# Patient Record
Sex: FEMALE | Race: WHITE | NOT HISPANIC OR LATINO | Employment: FULL TIME | URBAN - METROPOLITAN AREA
[De-identification: names, ages, dates, MRNs, and addresses within clinical notes are randomized per-mention and may not be internally consistent; named-entity substitution may affect disease eponyms.]

---

## 2023-10-24 PROBLEM — G89.29 CHRONIC BACK PAIN: Status: ACTIVE | Noted: 2023-10-24

## 2023-10-24 PROBLEM — G89.29 CHRONIC NECK PAIN: Status: ACTIVE | Noted: 2023-10-24

## 2023-10-24 PROBLEM — I83.90 VARICOSE VEINS OF LOWER EXTREMITY: Status: ACTIVE | Noted: 2023-10-24

## 2023-10-24 PROBLEM — M54.9 CHRONIC BACK PAIN: Status: ACTIVE | Noted: 2023-10-24

## 2023-10-24 PROBLEM — M19.90 ARTHRITIS: Status: ACTIVE | Noted: 2023-10-24

## 2023-10-24 PROBLEM — L98.8 FACIAL RHYTIDS: Status: ACTIVE | Noted: 2023-10-24

## 2023-10-24 PROBLEM — K64.9 HEMORRHOIDS: Status: ACTIVE | Noted: 2023-10-24

## 2023-10-24 PROBLEM — M54.2 CHRONIC NECK PAIN: Status: ACTIVE | Noted: 2023-10-24

## 2023-10-24 PROBLEM — R92.8 ABNORMAL MAMMOGRAM: Status: ACTIVE | Noted: 2023-10-24

## 2023-10-24 PROBLEM — E55.9 VITAMIN D DEFICIENCY: Status: ACTIVE | Noted: 2023-10-24

## 2023-10-24 RX ORDER — TRETINOIN 0.5 MG/G
CREAM TOPICAL NIGHTLY
COMMUNITY
Start: 2020-12-11 | End: 2023-10-25 | Stop reason: SDUPTHER

## 2023-10-24 RX ORDER — ERGOCALCIFEROL 1.25 MG/1
1 CAPSULE ORAL WEEKLY
COMMUNITY
Start: 2022-11-08 | End: 2023-11-07 | Stop reason: DRUGHIGH

## 2023-10-24 RX ORDER — MULTIVITAMIN WITH IRON
TABLET ORAL
COMMUNITY

## 2023-10-25 ENCOUNTER — OFFICE VISIT (OUTPATIENT)
Dept: OPHTHALMOLOGY | Facility: CLINIC | Age: 58
End: 2023-10-25
Payer: COMMERCIAL

## 2023-10-25 ENCOUNTER — OFFICE VISIT (OUTPATIENT)
Dept: PRIMARY CARE | Facility: CLINIC | Age: 58
End: 2023-10-25
Payer: COMMERCIAL

## 2023-10-25 VITALS
SYSTOLIC BLOOD PRESSURE: 120 MMHG | BODY MASS INDEX: 21.83 KG/M2 | OXYGEN SATURATION: 94 % | TEMPERATURE: 96.8 F | HEART RATE: 74 BPM | DIASTOLIC BLOOD PRESSURE: 78 MMHG | HEIGHT: 65 IN | WEIGHT: 131 LBS

## 2023-10-25 DIAGNOSIS — Z12.31 VISIT FOR SCREENING MAMMOGRAM: ICD-10-CM

## 2023-10-25 DIAGNOSIS — H52.4 HYPEROPIA WITH PRESBYOPIA OF BOTH EYES: Primary | ICD-10-CM

## 2023-10-25 DIAGNOSIS — Z00.00 HEALTHCARE MAINTENANCE: ICD-10-CM

## 2023-10-25 DIAGNOSIS — M20.11 VALGUS DEFORMITY OF BOTH GREAT TOES: ICD-10-CM

## 2023-10-25 DIAGNOSIS — E55.9 VITAMIN D DEFICIENCY: ICD-10-CM

## 2023-10-25 DIAGNOSIS — R92.8 ABNORMAL MAMMOGRAM: ICD-10-CM

## 2023-10-25 DIAGNOSIS — M20.12 VALGUS DEFORMITY OF BOTH GREAT TOES: ICD-10-CM

## 2023-10-25 DIAGNOSIS — L70.9 ACNE, UNSPECIFIED ACNE TYPE: ICD-10-CM

## 2023-10-25 DIAGNOSIS — Z00.00 ANNUAL PHYSICAL EXAM: Primary | ICD-10-CM

## 2023-10-25 DIAGNOSIS — H52.03 HYPEROPIA WITH PRESBYOPIA OF BOTH EYES: Primary | ICD-10-CM

## 2023-10-25 DIAGNOSIS — M54.2 NECK PAIN: ICD-10-CM

## 2023-10-25 DIAGNOSIS — Z80.3 FAMILY HISTORY OF BREAST CANCER: ICD-10-CM

## 2023-10-25 PROCEDURE — 93000 ELECTROCARDIOGRAM COMPLETE: CPT | Performed by: INTERNAL MEDICINE

## 2023-10-25 PROCEDURE — 92015 DETERMINE REFRACTIVE STATE: CPT | Performed by: STUDENT IN AN ORGANIZED HEALTH CARE EDUCATION/TRAINING PROGRAM

## 2023-10-25 PROCEDURE — 99396 PREV VISIT EST AGE 40-64: CPT | Performed by: INTERNAL MEDICINE

## 2023-10-25 PROCEDURE — 1036F TOBACCO NON-USER: CPT | Performed by: INTERNAL MEDICINE

## 2023-10-25 PROCEDURE — 92004 COMPRE OPH EXAM NEW PT 1/>: CPT | Performed by: STUDENT IN AN ORGANIZED HEALTH CARE EDUCATION/TRAINING PROGRAM

## 2023-10-25 RX ORDER — TRETINOIN 0.5 MG/G
CREAM TOPICAL NIGHTLY
Qty: 20 G | Refills: 1 | Status: SHIPPED | OUTPATIENT
Start: 2023-10-25

## 2023-10-25 ASSESSMENT — REFRACTION_MANIFEST
OD_CYLINDER: -2.00
OD_AXIS: 085
OS_AXIS: 087
OD_SPHERE: +2.00
OS_SPHERE: +1.75
OS_CYLINDER: -1.25
OD_ADD: +2.00
OS_ADD: +2.00

## 2023-10-25 ASSESSMENT — ENCOUNTER SYMPTOMS
GASTROINTESTINAL NEGATIVE: 0
CARDIOVASCULAR NEGATIVE: 0
NEUROLOGICAL NEGATIVE: 0
ENDOCRINE NEGATIVE: 0
ALLERGIC/IMMUNOLOGIC NEGATIVE: 0
PSYCHIATRIC NEGATIVE: 0
RESPIRATORY NEGATIVE: 0
HEMATOLOGIC/LYMPHATIC NEGATIVE: 0
EYES NEGATIVE: 0
CONSTITUTIONAL NEGATIVE: 0
MUSCULOSKELETAL NEGATIVE: 0

## 2023-10-25 ASSESSMENT — CONF VISUAL FIELD
OD_SUPERIOR_TEMPORAL_RESTRICTION: 0
OD_SUPERIOR_NASAL_RESTRICTION: 0
OS_NORMAL: 1
OS_INFERIOR_TEMPORAL_RESTRICTION: 0
OS_SUPERIOR_NASAL_RESTRICTION: 0
OD_INFERIOR_TEMPORAL_RESTRICTION: 0
OS_SUPERIOR_TEMPORAL_RESTRICTION: 0
METHOD: COUNTING FINGERS
OD_NORMAL: 1
OS_INFERIOR_NASAL_RESTRICTION: 0
OD_INFERIOR_NASAL_RESTRICTION: 0

## 2023-10-25 ASSESSMENT — VISUAL ACUITY
OD_SC+: +2
OD_SC: 20/40
METHOD: SNELLEN - LINEAR
OS_SC: 20/20
OS_SC+: -2

## 2023-10-25 ASSESSMENT — SLIT LAMP EXAM - LIDS
COMMENTS: NORMAL
COMMENTS: NORMAL

## 2023-10-25 ASSESSMENT — PATIENT HEALTH QUESTIONNAIRE - PHQ9
2. FEELING DOWN, DEPRESSED OR HOPELESS: NOT AT ALL
1. LITTLE INTEREST OR PLEASURE IN DOING THINGS: NOT AT ALL
SUM OF ALL RESPONSES TO PHQ9 QUESTIONS 1 AND 2: 0

## 2023-10-25 ASSESSMENT — TONOMETRY
OD_IOP_MMHG: 19
IOP_METHOD: GOLDMANN APPLANATION
OS_IOP_MMHG: 20

## 2023-10-25 ASSESSMENT — CUP TO DISC RATIO
OD_RATIO: .30
OS_RATIO: .40

## 2023-10-25 ASSESSMENT — EXTERNAL EXAM - RIGHT EYE: OD_EXAM: NORMAL

## 2023-10-25 ASSESSMENT — EXTERNAL EXAM - LEFT EYE: OS_EXAM: NORMAL

## 2023-10-25 NOTE — PROGRESS NOTES
Assessment/Plan   Problem List Items Addressed This Visit          Eye/Vision problems    Hyperopia with presbyopia of both eyes - Primary     New spec rx released today per patient request. Ocular health wnl for age OU. Monitor 1 year or sooner prn. Refraction billed today. Discussed first time PAL use vs. DVO/NVO specs.           RTC 1 year for annual with JO ANN and BRISEIDA

## 2023-10-25 NOTE — ASSESSMENT & PLAN NOTE
New spec rx released today per patient request. Ocular health wnl for age OU. Monitor 1 year or sooner prn. Refraction billed today. Discussed first time PAL use vs. DVO/NVO specs.

## 2023-10-25 NOTE — PROGRESS NOTES
"Subjective   Patient ID: Baylee Mccormick is a 58 y.o. female who presents for Annual Exam.    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  Last colonoscopy 9/4/2019 showed hyperplastic polyp next in 10 years ,last mammogram November 19/2022, kdue now,her mother had breast cancer onset age 50 she is asking if she should do genetic testing patient has 2 daughter.  I advised her to do so.  She had a stressful year her  was ill with non-Hodgkin lymphoma her daughter has anxiety, patient able to manage at work and deny any insomnia, she has occasional fluttering in the chest.  She had her flu vaccine at work ,other vaccination up-to-date.  She has neck pain for which has been doing massotherapy at [facility] and she needs a referral.  She has bunion of both feet and asking for referral to see someone.           Review of Systems   Constitutional: Negative.    HENT: Negative.     Eyes: Negative.    Respiratory: Negative.     Cardiovascular:  Positive for palpitations.   Gastrointestinal: Negative.    Genitourinary: Negative.    Musculoskeletal:         As HPI.   Neurological: Negative.    Hematological: Negative.    Psychiatric/Behavioral: Negative.          As HPI.       Objective   /78 (BP Location: Left arm, Patient Position: Sitting)   Pulse 74   Temp 36 °C (96.8 °F) (Temporal)   Ht 1.651 m (5' 5\")   Wt 59.4 kg (131 lb)   SpO2 94%   BMI 21.80 kg/m²     Physical Exam  Vitals reviewed.   Constitutional:       General: She is not in acute distress.     Appearance: Normal appearance. She is normal weight.   HENT:      Head: Normocephalic and atraumatic.      Right Ear: Tympanic membrane and ear canal normal.      Left Ear: Tympanic membrane and ear canal normal.      Nose: Nose normal.      Mouth/Throat:      Pharynx: No oropharyngeal exudate or posterior oropharyngeal " erythema.   Eyes:      General: No scleral icterus.     Extraocular Movements: Extraocular movements intact.      Pupils: Pupils are equal, round, and reactive to light.   Neck:      Vascular: No carotid bruit.   Cardiovascular:      Rate and Rhythm: Normal rate and regular rhythm.      Pulses: Normal pulses.      Heart sounds: Normal heart sounds. No murmur heard.  Pulmonary:      Effort: Pulmonary effort is normal.      Breath sounds: Normal breath sounds.   Chest:   Breasts:     Right: Normal. No mass, skin change or tenderness.      Left: Normal. No mass, skin change or tenderness.   Abdominal:      General: Abdomen is flat. Bowel sounds are normal.      Palpations: Abdomen is soft.   Musculoskeletal:         General: Normal range of motion.      Cervical back: Normal range of motion and neck supple.      Right lower leg: No edema.      Left lower leg: No edema.      Comments: Prominent hallux valgus bilaterally.   Lymphadenopathy:      Cervical: No cervical adenopathy.      Upper Body:      Right upper body: No supraclavicular or axillary adenopathy.      Left upper body: No supraclavicular or axillary adenopathy.   Skin:     Comments: Some itchy marks.  Small abrasion right foot from shaving.   Neurological:      General: No focal deficit present.      Mental Status: She is alert and oriented to person, place, and time.   Psychiatric:         Mood and Affect: Mood normal.         Assessment/Plan   Problem List Items Addressed This Visit             ICD-10-CM    Abnormal mammogram R92.8    Vitamin D deficiency E55.9    Relevant Orders    Vitamin D 25-Hydroxy,Total (for eval of Vitamin D levels)    Annual physical exam - Primary Z00.00    Relevant Orders    CBC and Auto Differential    Comprehensive Metabolic Panel    Hemoglobin A1C    Lipid Panel    TSH with reflex to Free T4 if abnormal     Other Visit Diagnoses         Codes    Visit for screening mammogram     Z12.31    Relevant Orders    BI mammo bilateral  screening tomosynthesis    Neck pain     M54.2    Relevant Orders    Referral to [facility]    Acne, unspecified acne type     L70.9    Relevant Medications    tretinoin (Retin-A) 0.05 % cream    Valgus deformity of both great toes     M20.11, M20.12    Relevant Orders    Referral to Orthopaedic Surgery    Family history of breast cancer     Z80.3    Relevant Orders    Referral to Genetics    Healthcare maintenance     Z00.00    Relevant Orders    ECG 12 lead (Clinic Performed) (Completed)

## 2023-10-26 ASSESSMENT — ENCOUNTER SYMPTOMS
NEUROLOGICAL NEGATIVE: 1
CONSTITUTIONAL NEGATIVE: 1
PSYCHIATRIC NEGATIVE: 1
EYES NEGATIVE: 1
HEMATOLOGIC/LYMPHATIC NEGATIVE: 1
PALPITATIONS: 1
RESPIRATORY NEGATIVE: 1
GASTROINTESTINAL NEGATIVE: 1

## 2023-11-02 ENCOUNTER — LAB (OUTPATIENT)
Dept: LAB | Facility: LAB | Age: 58
End: 2023-11-02
Payer: COMMERCIAL

## 2023-11-02 DIAGNOSIS — E55.9 VITAMIN D DEFICIENCY: ICD-10-CM

## 2023-11-02 DIAGNOSIS — Z00.00 ANNUAL PHYSICAL EXAM: ICD-10-CM

## 2023-11-02 LAB
25(OH)D3 SERPL-MCNC: 16 NG/ML (ref 30–100)
ALBUMIN SERPL BCP-MCNC: 4.1 G/DL (ref 3.4–5)
ALP SERPL-CCNC: 60 U/L (ref 33–110)
ALT SERPL W P-5'-P-CCNC: 20 U/L (ref 7–45)
ANION GAP SERPL CALC-SCNC: 10 MMOL/L (ref 10–20)
AST SERPL W P-5'-P-CCNC: 21 U/L (ref 9–39)
BASOPHILS # BLD AUTO: 0.04 X10*3/UL (ref 0–0.1)
BASOPHILS NFR BLD AUTO: 0.8 %
BILIRUB SERPL-MCNC: 0.7 MG/DL (ref 0–1.2)
BUN SERPL-MCNC: 19 MG/DL (ref 6–23)
CALCIUM SERPL-MCNC: 9 MG/DL (ref 8.6–10.3)
CHLORIDE SERPL-SCNC: 108 MMOL/L (ref 98–107)
CHOLEST SERPL-MCNC: 213 MG/DL (ref 0–199)
CHOLESTEROL/HDL RATIO: 3.3
CO2 SERPL-SCNC: 28 MMOL/L (ref 21–32)
CREAT SERPL-MCNC: 0.78 MG/DL (ref 0.5–1.05)
EOSINOPHIL # BLD AUTO: 0.11 X10*3/UL (ref 0–0.7)
EOSINOPHIL NFR BLD AUTO: 2.3 %
ERYTHROCYTE [DISTWIDTH] IN BLOOD BY AUTOMATED COUNT: 12.9 % (ref 11.5–14.5)
EST. AVERAGE GLUCOSE BLD GHB EST-MCNC: 108 MG/DL
GFR SERPL CREATININE-BSD FRML MDRD: 89 ML/MIN/1.73M*2
GLUCOSE SERPL-MCNC: 87 MG/DL (ref 74–99)
HBA1C MFR BLD: 5.4 %
HCT VFR BLD AUTO: 36.9 % (ref 36–46)
HDLC SERPL-MCNC: 64.5 MG/DL
HGB BLD-MCNC: 12 G/DL (ref 12–16)
IMM GRANULOCYTES # BLD AUTO: 0.01 X10*3/UL (ref 0–0.7)
IMM GRANULOCYTES NFR BLD AUTO: 0.2 % (ref 0–0.9)
LDLC SERPL CALC-MCNC: 132 MG/DL
LYMPHOCYTES # BLD AUTO: 1.64 X10*3/UL (ref 1.2–4.8)
LYMPHOCYTES NFR BLD AUTO: 34.7 %
MCH RBC QN AUTO: 29.9 PG (ref 26–34)
MCHC RBC AUTO-ENTMCNC: 32.5 G/DL (ref 32–36)
MCV RBC AUTO: 92 FL (ref 80–100)
MONOCYTES # BLD AUTO: 0.4 X10*3/UL (ref 0.1–1)
MONOCYTES NFR BLD AUTO: 8.5 %
NEUTROPHILS # BLD AUTO: 2.52 X10*3/UL (ref 1.2–7.7)
NEUTROPHILS NFR BLD AUTO: 53.5 %
NON HDL CHOLESTEROL: 149 MG/DL (ref 0–149)
NRBC BLD-RTO: 0 /100 WBCS (ref 0–0)
PLATELET # BLD AUTO: 189 X10*3/UL (ref 150–450)
POTASSIUM SERPL-SCNC: 4 MMOL/L (ref 3.5–5.3)
PROT SERPL-MCNC: 6.4 G/DL (ref 6.4–8.2)
RBC # BLD AUTO: 4.02 X10*6/UL (ref 4–5.2)
SODIUM SERPL-SCNC: 142 MMOL/L (ref 136–145)
TRIGL SERPL-MCNC: 84 MG/DL (ref 0–149)
TSH SERPL-ACNC: 1.04 MIU/L (ref 0.44–3.98)
VLDL: 17 MG/DL (ref 0–40)
WBC # BLD AUTO: 4.7 X10*3/UL (ref 4.4–11.3)

## 2023-11-02 PROCEDURE — 80053 COMPREHEN METABOLIC PANEL: CPT

## 2023-11-02 PROCEDURE — 80061 LIPID PANEL: CPT

## 2023-11-02 PROCEDURE — 84443 ASSAY THYROID STIM HORMONE: CPT

## 2023-11-02 PROCEDURE — 82306 VITAMIN D 25 HYDROXY: CPT

## 2023-11-02 PROCEDURE — 83036 HEMOGLOBIN GLYCOSYLATED A1C: CPT

## 2023-11-02 PROCEDURE — 85025 COMPLETE CBC W/AUTO DIFF WBC: CPT

## 2023-11-02 PROCEDURE — 36415 COLL VENOUS BLD VENIPUNCTURE: CPT

## 2023-11-03 NOTE — RESULT ENCOUNTER NOTE
Lab show very low vitamin D at 16,cholesterol mildy elevated,rest of lab results within normal range. I am sending a Rx for vitamin D to your pharmacy to be taken for 3 months,follow low fat diet.

## 2023-11-07 ENCOUNTER — TELEPHONE (OUTPATIENT)
Dept: PRIMARY CARE | Facility: CLINIC | Age: 58
End: 2023-11-07
Payer: COMMERCIAL

## 2023-11-07 DIAGNOSIS — E55.9 VITAMIN D DEFICIENCY: Primary | ICD-10-CM

## 2023-11-07 RX ORDER — ERGOCALCIFEROL 1.25 MG/1
CAPSULE ORAL
Qty: 12 CAPSULE | Refills: 0 | Status: SHIPPED | OUTPATIENT
Start: 2023-11-07 | End: 2023-11-09

## 2023-11-07 NOTE — TELEPHONE ENCOUNTER
Pt of Dr Dao's. Per a message from her reviewing her labs, she was going to call in Vitamin D to [facility] on [address] in [place]. Pt stated it is not there and she is just wanting to have it possibly called in to start this since she is going to be leaving town tomorrow. Please advise. Thank you!

## 2023-11-09 ENCOUNTER — TELEPHONE (OUTPATIENT)
Dept: PRIMARY CARE | Facility: CLINIC | Age: 58
End: 2023-11-09
Payer: COMMERCIAL

## 2023-11-09 DIAGNOSIS — E55.9 VITAMIN D DEFICIENCY: Primary | ICD-10-CM

## 2023-11-09 RX ORDER — ERGOCALCIFEROL 1.25 MG/1
CAPSULE ORAL
Qty: 24 CAPSULE | Refills: 0 | Status: SHIPPED | OUTPATIENT
Start: 2023-11-09

## 2023-11-09 NOTE — TELEPHONE ENCOUNTER
Nicole from [facility] is calling on behalf of Vitamin D rx that you sent over. Quantity should be 24 but you only sent 12. Please resend rx with a quantity of 24 since it is twice a day for 3 months.

## 2023-11-30 ENCOUNTER — ANCILLARY PROCEDURE (OUTPATIENT)
Dept: RADIOLOGY | Facility: CLINIC | Age: 58
End: 2023-11-30
Payer: COMMERCIAL

## 2023-11-30 DIAGNOSIS — Z12.31 VISIT FOR SCREENING MAMMOGRAM: ICD-10-CM

## 2023-11-30 PROCEDURE — 77067 SCR MAMMO BI INCL CAD: CPT | Performed by: STUDENT IN AN ORGANIZED HEALTH CARE EDUCATION/TRAINING PROGRAM

## 2023-11-30 PROCEDURE — 77063 BREAST TOMOSYNTHESIS BI: CPT | Performed by: STUDENT IN AN ORGANIZED HEALTH CARE EDUCATION/TRAINING PROGRAM

## 2023-11-30 PROCEDURE — 77067 SCR MAMMO BI INCL CAD: CPT

## 2024-05-29 ENCOUNTER — OFFICE VISIT (OUTPATIENT)
Dept: PLASTIC SURGERY | Facility: CLINIC | Age: 59
End: 2024-05-29

## 2024-05-29 DIAGNOSIS — L98.8 FACIAL RHYTIDS: Primary | ICD-10-CM

## 2024-05-29 PROCEDURE — COSBX COSMETIC BOTOX: Performed by: PHYSICIAN ASSISTANT

## 2024-05-31 NOTE — PROGRESS NOTES
Department of Plastic and Reconstructive Surgery            Facial Rejuvenation     Date: 5/29/24      Subjective   Baylee Mccormick is a 59 y.o. female who presents for facial rejuvenation.     She last underwent Botox injection to the forehead, brows, and bilateral crows feet in 9/2023. She tolerated previous injection and was happy with aesthetic outcome.       Objective   Vital Signs: There were no vitals filed for this visit.    Gen: interactive and pleasant  Head: NCAT  Eyes: EOMI, PERRLA  Mouth: MMM  Cor: RRR  Pulm: nonlabored breathing  Neuro: AAOx3, CN II-XII intact      Focused exam of the: face  There are transverse dynmatic rhytids present with animation of there face. There are few resting transverse rhytids of the forehead present. There are periorbital rhytids present with smiling.       Procedure:   Botox Injection    Baylee came today for Botox injection.  I reviewed with her the risks and benefits including ptosis, infection, and bleeding. She has no contraindications. She is on no antibiotics and has no neuromuscular disorders.  Pregnancy is not an issue.     She tolerated the procedure well.  The patient  will return to see me again in three to four months, earlier if there are any problems.     Baylee understands the side effects and risks, as well as the necessity for continued treatment to maintain improvement.  Additional therapy may be necessary. Call if there are any problems. See diagram for injection sites and dosages. 50 units were used at a concentration of 10 units per 0.1 mL.   Lot: [id number]  Exp: 2026/05    20 units forehead  20 units procerus/corrugators  10units bilateral crows  50 units total        Assessment/Plan     Baylee Mccormick is a 59 y.o. female who presents for facial rejuvenation.    She presents today for facial rejuvenation. She underwent injection of 50 units Botox to the forehead, brows, and bilateral crows feet.     Plan:   Follow up  PRN    I spent 20 minutes with this patient. Greater than 50% of this time was spent in the counselling and/or coordination of care of this patient.  This note was created using voice recognition software and was not corrected for typographical or grammatical errors.    Signature: Monique Calderon PA-C

## 2024-07-29 ENCOUNTER — HOSPITAL ENCOUNTER (OUTPATIENT)
Dept: RADIOLOGY | Facility: HOSPITAL | Age: 59
Discharge: HOME | End: 2024-07-29
Payer: COMMERCIAL

## 2024-07-29 ENCOUNTER — OFFICE VISIT (OUTPATIENT)
Dept: ORTHOPEDIC SURGERY | Facility: HOSPITAL | Age: 59
End: 2024-07-29
Payer: COMMERCIAL

## 2024-07-29 VITALS — WEIGHT: 135 LBS | HEIGHT: 65 IN | BODY MASS INDEX: 22.49 KG/M2

## 2024-07-29 DIAGNOSIS — M25.561 ACUTE PAIN OF RIGHT KNEE: ICD-10-CM

## 2024-07-29 DIAGNOSIS — M25.361 INSTABILITY OF RIGHT KNEE JOINT: Primary | ICD-10-CM

## 2024-07-29 PROCEDURE — 3008F BODY MASS INDEX DOCD: CPT | Performed by: EMERGENCY MEDICINE

## 2024-07-29 PROCEDURE — L1812 KO ELASTIC W/JOINTS PRE OTS: HCPCS | Performed by: EMERGENCY MEDICINE

## 2024-07-29 PROCEDURE — 73564 X-RAY EXAM KNEE 4 OR MORE: CPT | Mod: RIGHT SIDE | Performed by: RADIOLOGY

## 2024-07-29 PROCEDURE — 99204 OFFICE O/P NEW MOD 45 MIN: CPT | Performed by: EMERGENCY MEDICINE

## 2024-07-29 PROCEDURE — 73564 X-RAY EXAM KNEE 4 OR MORE: CPT | Mod: RT

## 2024-07-29 PROCEDURE — 99214 OFFICE O/P EST MOD 30 MIN: CPT | Performed by: EMERGENCY MEDICINE

## 2024-07-29 RX ORDER — MELOXICAM 15 MG/1
15 TABLET ORAL DAILY
Qty: 30 TABLET | Refills: 0 | Status: SHIPPED | OUTPATIENT
Start: 2024-07-29 | End: 2024-08-28
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
## 2024-07-30 ENCOUNTER — HOSPITAL ENCOUNTER (OUTPATIENT)
Dept: RADIOLOGY | Facility: CLINIC | Age: 59
Discharge: HOME | End: 2024-07-30
Payer: COMMERCIAL

## 2024-07-30 DIAGNOSIS — M25.361 INSTABILITY OF RIGHT KNEE JOINT: ICD-10-CM

## 2024-07-30 DIAGNOSIS — S83.511S RUPTURE OF ANTERIOR CRUCIATE LIGAMENT OF RIGHT KNEE, SEQUELA: ICD-10-CM

## 2024-07-30 DIAGNOSIS — M25.361 INSTABILITY OF RIGHT KNEE JOINT: Primary | ICD-10-CM

## 2024-07-30 PROCEDURE — 73721 MRI JNT OF LWR EXTRE W/O DYE: CPT | Mod: RT

## 2024-07-30 PROCEDURE — 73721 MRI JNT OF LWR EXTRE W/O DYE: CPT | Mod: RIGHT SIDE | Performed by: STUDENT IN AN ORGANIZED HEALTH CARE EDUCATION/TRAINING PROGRAM

## 2024-07-30 NOTE — PROGRESS NOTES
Discussed MRI findings with patient.  She does have a complete ACL rupture and small posterior tibial plateau fracture.  For this reason, discussed with patient that she avoid weightbearing over the next 2 weeks with use of crutches.  She has crutches at home to use.  She should continue using the hinged knee brace.  I would like to speak with her in 2 weeks.  If she is doing well, I will let her begin light weightbearing as tolerated within the brace.  I will see her back for regularly scheduled follow-up visit.

## 2024-08-01 ENCOUNTER — TELEPHONE (OUTPATIENT)
Dept: ORTHOPEDIC SURGERY | Facility: CLINIC | Age: 59
End: 2024-08-01
Payer: COMMERCIAL

## 2024-08-01 NOTE — TELEPHONE ENCOUNTER
----- Message from Wilner MORGAN sent at 8/1/2024  4:08 PM EDT -----  Patient called she would like to get in to see Jacqueline. She was seen by Monica in injury clinic but she trust Jacqueline and would like for him to view her MRI and look at her ACL tear. He operated on her son Jose Suazo a couple years ago.    Thanks

## 2024-08-01 NOTE — TELEPHONE ENCOUNTER
We reviewed that if we need to hold off on proceeding with surgery we can, as she has her oldest son's wedding in January. If she feels like she cannot wait that long, we can discuss dates at the appt on 8/12. She is very thankful for the care she has received. She said the ATC on Monday in the [place], [place], was so helpful and comforting. She is nervous but knows she is in good hands after having to go through this with her son previously. She thanked me for the help and guidance. There were no further needs.

## 2024-08-05 ENCOUNTER — DOCUMENTATION (OUTPATIENT)
Dept: ORTHOPEDIC SURGERY | Facility: HOSPITAL | Age: 59
End: 2024-08-05
Payer: COMMERCIAL

## 2024-08-05 NOTE — PROGRESS NOTES
I spoke with the patient regarding the MRI on her knee.  She is scheduled to see Dr. Phillips on 8/12/2024.    I recommended that she continue with her brace and utilize crutches.  She can be 50% weightbearing.  She is instructed on heel slides and ankle pumps to help maintain her motion and reduce swelling.  She can also ice frequently and utilize meloxicam.  She verbalized understanding and agreed with this plan and will follow-up as scheduled.

## 2024-08-12 ENCOUNTER — OFFICE VISIT (OUTPATIENT)
Dept: ORTHOPEDIC SURGERY | Facility: HOSPITAL | Age: 59
End: 2024-08-12
Payer: COMMERCIAL

## 2024-08-12 DIAGNOSIS — S83.511A COMPLETE TEAR OF ANTERIOR CRUCIATE LIGAMENT OF RIGHT KNEE, INITIAL ENCOUNTER: Primary | ICD-10-CM

## 2024-08-12 PROCEDURE — 99203 OFFICE O/P NEW LOW 30 MIN: CPT | Performed by: ORTHOPAEDIC SURGERY

## 2024-08-12 PROCEDURE — 99213 OFFICE O/P EST LOW 20 MIN: CPT | Performed by: ORTHOPAEDIC SURGERY

## 2024-08-12 NOTE — LETTER
August 15, 2024     Patient: Baylee Mccormick   YOB: 1965   Date of Visit: 8/12/2024       To Whom It May Concern:    It is my medical opinion that Baylee Mccormick continue off work for 10-14 more days. She will be scheduling a procedure with my office, date has yet to be determined. Please feel free to fax any paperwork to our office [phone].    If you have any questions or concerns, please don't hesitate to call.         Sincerely,        Todd Phillips MD    CC: No Recipients

## 2024-08-15 ENCOUNTER — EVALUATION (OUTPATIENT)
Dept: PHYSICAL THERAPY | Facility: CLINIC | Age: 59
End: 2024-08-15
Payer: COMMERCIAL

## 2024-08-15 DIAGNOSIS — S83.519D RUPTURE OF ANTERIOR CRUCIATE LIGAMENT OF KNEE, SUBSEQUENT ENCOUNTER: Primary | ICD-10-CM

## 2024-08-15 DIAGNOSIS — S83.511S RUPTURE OF ANTERIOR CRUCIATE LIGAMENT OF RIGHT KNEE, SEQUELA: ICD-10-CM

## 2024-08-15 PROCEDURE — 97161 PT EVAL LOW COMPLEX 20 MIN: CPT | Mod: GP

## 2024-08-15 PROCEDURE — 97110 THERAPEUTIC EXERCISES: CPT | Mod: GP
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
## 2024-08-19 ENCOUNTER — APPOINTMENT (OUTPATIENT)
Dept: ORTHOPEDIC SURGERY | Facility: HOSPITAL | Age: 59
End: 2024-08-19
Payer: COMMERCIAL

## 2024-08-21 ENCOUNTER — TREATMENT (OUTPATIENT)
Dept: PHYSICAL THERAPY | Facility: CLINIC | Age: 59
End: 2024-08-21
Payer: COMMERCIAL

## 2024-08-21 DIAGNOSIS — S83.519D RUPTURE OF ANTERIOR CRUCIATE LIGAMENT OF KNEE, SUBSEQUENT ENCOUNTER: Primary | ICD-10-CM

## 2024-08-21 DIAGNOSIS — S83.511S RUPTURE OF ANTERIOR CRUCIATE LIGAMENT OF RIGHT KNEE, SEQUELA: ICD-10-CM

## 2024-08-21 PROCEDURE — 97110 THERAPEUTIC EXERCISES: CPT | Mod: GP,CQ

## 2024-08-21 ASSESSMENT — PAIN SCALES - GENERAL: PAINLEVEL_OUTOF10: 0 - NO PAIN

## 2024-08-21 ASSESSMENT — PAIN - FUNCTIONAL ASSESSMENT: PAIN_FUNCTIONAL_ASSESSMENT: 0-10

## 2024-08-21 NOTE — PROGRESS NOTES
"Physical Therapy    Physical Therapy Treatment    Patient Name: Baylee Mccormick  MRN: [id number]  Today's Date: 8/21/2024    Time Entry:   Time Calculation  Start Time: 0800  Stop Time: 0850  Time Calculation (min): 50 min     PT Therapeutic Procedures Time Entry  Therapeutic Exercise Time Entry: 50      Insurance:  Visit: #2  Authorized: JYOTSNA PEDROZA AFTER EVAL   Certification: 8/15/24 to 11/13/24  Payor:  EMPLOYEE MEDICAL PLAN / Plan:  EMPLOYEE MEDICAL PLAN CONSUMER [employer] / Product Type: *No Product type* /              Assessment:   Brace donned for all weight bearing activities. Pt demonstrates improved right knee ROM since initial evaluation. She would benefit from PT to continue to address impairments in order to improve strength, flexibility, gait, and body mechanics and to decrease symptoms and increase overall function.   Plan:  Continue to progress right LE strengthening as soirn. WBAT in brace   OP PT Plan  PT Plan: Skilled PT    Current Problem  1. Rupture of anterior cruciate ligament of knee, subsequent encounter        2. Rupture of anterior cruciate ligament of right knee, sequela                      Subjective   Pt denies pain in right knee. She states that she has been performing HEP and the exercises are going well.   Precautions  Precautions  STEADI Fall Risk Score (The score of 4 or more indicates an increased risk of falling): 0     LE Weight Bearing Status: 8/20/24: WBAT in brace per Abimael Jacobson PA-C   Pain  Pain Assessment  Pain Assessment: 0-10  0-10 (Numeric) Pain Score: 0 - No pain    Objective      AROM right knee  Extension 0  Flexion 130                   Treatments:     Quad set 10\" hold x10  Heel slide 2x10  SLR x10 with quad set  Sidelying hip abduction x 10  Hamstring stretch 30 sec 3   Calf stretch 30 sec x 3   LAQ 5\" hold and eccentric focus x10  Standing heel raises x10 with brace donned   Wall sit 5x5\"with brace donned     OP EDUCATION:   8/21/24: Added long sitting hamstring " stretch and gastroc stretch with strap to HEP     Goals:  Improve pain to 0/10 at worst by 6 weeks  Improve AROM R knee -3 to 140 degrees painfree to allow improved stair negotiation by 6 weeks  Improve MMT R knee to 4+/5 to allow improved R knee stability by 6 weeks  Ambulate without assistive device and normalized gait pattern by 8 weeks  Demonstrate up/down 12 steps independently while maintaining appropriate weightbearing status by 8 weeks

## 2024-08-23 ENCOUNTER — APPOINTMENT (OUTPATIENT)
Dept: PHYSICAL THERAPY | Facility: CLINIC | Age: 59
End: 2024-08-23
Payer: COMMERCIAL

## 2024-08-27 ENCOUNTER — TREATMENT (OUTPATIENT)
Dept: PHYSICAL THERAPY | Facility: CLINIC | Age: 59
End: 2024-08-27
Payer: COMMERCIAL

## 2024-08-27 ENCOUNTER — APPOINTMENT (OUTPATIENT)
Dept: PHYSICAL THERAPY | Facility: CLINIC | Age: 59
End: 2024-08-27
Payer: COMMERCIAL

## 2024-08-27 ENCOUNTER — PATIENT MESSAGE (OUTPATIENT)
Dept: PLASTIC SURGERY | Facility: CLINIC | Age: 59
End: 2024-08-27

## 2024-08-27 DIAGNOSIS — S83.519D RUPTURE OF ANTERIOR CRUCIATE LIGAMENT OF KNEE, SUBSEQUENT ENCOUNTER: Primary | ICD-10-CM

## 2024-08-27 PROCEDURE — 97110 THERAPEUTIC EXERCISES: CPT | Mod: GP

## 2024-08-27 PROCEDURE — 97530 THERAPEUTIC ACTIVITIES: CPT | Mod: GP

## 2024-08-27 ASSESSMENT — PAIN SCALES - GENERAL: PAINLEVEL_OUTOF10: 0 - NO PAIN

## 2024-08-27 ASSESSMENT — PAIN - FUNCTIONAL ASSESSMENT: PAIN_FUNCTIONAL_ASSESSMENT: 0-10

## 2024-08-27 NOTE — PROGRESS NOTES
"Physical Therapy    Physical Therapy Treatment    Patient Name: Baylee Mccormick  MRN: [id number]  Today's Date: 8/27/2024    Time Entry:   Time Calculation  Start Time: 0716  Stop Time: 0802  Time Calculation (min): 46 min     PT Therapeutic Procedures Time Entry  Therapeutic Exercise Time Entry: 35  Therapeutic Activity Time Entry: 10      Insurance:  Visit: #3  Authorized: JYOTSNA PEDROZA AFTER EVAL   Certification: 8/15/24 to 11/13/24  Payor:  EMPLOYEE MEDICAL PLAN / Plan:  EMPLOYEE MEDICAL PLAN CONSUMER [employer] / Product Type: *No Product type* /              Assessment:   Pt requires cues for quad activation with SLR and for positioning during sidelying hip abduction. She tolerates addition of mini squats, TKE, and biking without increased pain, however reports fatigue. Focus on quad strenthening and stabilization exercises today. Her AROM R knee is improved vs initial evaluation.     Plan:  Continue to progress right LE strengthening as sorin. WBAT in brace        Current Problem  1. Rupture of anterior cruciate ligament of knee, subsequent encounter                PT  Visit  PT Received On: 08/27/24       Subjective   Pt reports pain levels have been good, returned to work yesterday and had some soreness but overall felt good. She reports she has been going up and down stairs with brace on with good tolerance.     Precautions        LE Weight Bearing Status: 8/20/24: WBAT in brace per Abimael Jacosbon PA-C   Pain  Pain Assessment  Pain Assessment: 0-10  0-10 (Numeric) Pain Score: 0 - No pain    Objective      AROM right knee  Extension -1  Flexion 137                   Treatments:     Bike 5 mins lvl 11; 65 rpm, seat 15  Quad set 10\" hold x10  Heel slide 2x10  SLR x10 with quad set  Bridge 2x10  Sidelying hip abduction x 10  Hamstring stretch 30 sec 3   Calf stretch 30 sec x 3 NT  LAQ 5\" hold and eccentric focus x10  Standing heel raises x10 with brace donned NT  Wall sit 3x30\" with brace donned   Mini Squat x10 with RLE " back  TKE blue tb x15      OP EDUCATION:   Discussed tissue healing timelines and knee anatomy, expected recovery following surgery    Goals:  Improve pain to 0/10 at worst by 6 weeks  Improve AROM R knee -3 to 140 degrees painfree to allow improved stair negotiation by 6 weeks  Improve MMT R knee to 4+/5 to allow improved R knee stability by 6 weeks  Ambulate without assistive device and normalized gait pattern by 8 weeks  Demonstrate up/down 12 steps independently while maintaining appropriate weightbearing status by 8 weeks

## 2024-08-28 ENCOUNTER — APPOINTMENT (OUTPATIENT)
Dept: PHYSICAL THERAPY | Facility: CLINIC | Age: 59
End: 2024-08-28
Payer: COMMERCIAL

## 2024-08-30 ENCOUNTER — APPOINTMENT (OUTPATIENT)
Dept: PHYSICAL THERAPY | Facility: CLINIC | Age: 59
End: 2024-08-30
Payer: COMMERCIAL

## 2024-09-03 ENCOUNTER — APPOINTMENT (OUTPATIENT)
Dept: PHYSICAL THERAPY | Facility: CLINIC | Age: 59
End: 2024-09-03
Payer: COMMERCIAL

## 2024-09-03 ENCOUNTER — TREATMENT (OUTPATIENT)
Dept: PHYSICAL THERAPY | Facility: CLINIC | Age: 59
End: 2024-09-03
Payer: COMMERCIAL

## 2024-09-03 DIAGNOSIS — S83.519D RUPTURE OF ANTERIOR CRUCIATE LIGAMENT OF KNEE, SUBSEQUENT ENCOUNTER: Primary | ICD-10-CM

## 2024-09-03 PROCEDURE — 97110 THERAPEUTIC EXERCISES: CPT | Mod: GP

## 2024-09-03 PROCEDURE — 97530 THERAPEUTIC ACTIVITIES: CPT | Mod: GP

## 2024-09-03 ASSESSMENT — PAIN - FUNCTIONAL ASSESSMENT: PAIN_FUNCTIONAL_ASSESSMENT: 0-10

## 2024-09-03 ASSESSMENT — PAIN SCALES - GENERAL: PAINLEVEL_OUTOF10: 0 - NO PAIN

## 2024-09-03 NOTE — PROGRESS NOTES
"Physical Therapy    Physical Therapy Treatment    Patient Name: Baylee Mccormick  MRN: [id number]  Today's Date: 9/3/2024    Time Entry:   Time Calculation  Start Time: 0716  Stop Time: 0805  Time Calculation (min): 49 min     PT Therapeutic Procedures Time Entry  Therapeutic Exercise Time Entry: 34  Therapeutic Activity Time Entry: 12      Insurance:  Visit: #4  Authorized: JYOTSNA PEDROZA AFTER EVAL   Certification: 8/15/24 to 11/13/24  Payor:  EMPLOYEE MEDICAL PLAN / Plan:  EMPLOYEE MEDICAL PLAN CONSUMER SELECT / Product Type: *No Product type* /              Assessment:  PT Assessment  Assessment Comment: Pt with slight quad lag with SLR requiring cues to maintain. Addition of 1# weight to LAQ with good tolerance. She reports fatigue with wall sits but denies any significant pain. Continued focus on strengthening and stabilization exercises this date.    Plan:  Continue to progress right LE strengthening as sorin. WBAT in brace        Current Problem  1. Rupture of anterior cruciate ligament of knee, subsequent encounter                  PT  Visit  PT Received On: 09/03/24       Subjective   Pt reports she has been feeling a little discomfort, but believes it has to do with returning to work. States she feels a little sore but otherwise is feeling good. Denies any increased pain after last visit.    Precautions  Precautions  STEADI Fall Risk Score (The score of 4 or more indicates an increased risk of falling): 0  LE Weight Bearing Status: Weight Bearing as Tolerated (In brace)     LE Weight Bearing Status: 8/20/24: WBAT in brace per Abimael Jacobson PA-C   Pain  Pain Assessment  Pain Assessment: 0-10  0-10 (Numeric) Pain Score: 0 - No pain (\"discomfort\")    Objective      AROM right knee  Extension -3  Flexion 137                   Treatments:     Bike 5 mins lvl 10; 65 rpm, seat 13  Quad set 10\" hold x10 NT  Heel slide 2x10 NT  SLR 3x10 with quad set  Bridge 3 x10  Sidelying hip abduction 3 x 10  Hamstring stretch 30 sec " "3   Calf stretch 30 sec x 3 NT  LAQ 5\" hold and eccentric focus x10 1#  Standing heel raises x10 with brace donned NT  Wall sit 3x30\" with brace donned   Mini Squat 2x10 with RLE back  TKE blue tb x15 NT  Add next: hamstring bridge walkout      OP EDUCATION:   Discussed tissue healing timelines and knee anatomy, expected recovery following surgery    Goals:  Improve pain to 0/10 at worst by 6 weeks  Improve AROM R knee -3 to 140 degrees painfree to allow improved stair negotiation by 6 weeks  Improve MMT R knee to 4+/5 to allow improved R knee stability by 6 weeks  Ambulate without assistive device and normalized gait pattern by 8 weeks  Demonstrate up/down 12 steps independently while maintaining appropriate weightbearing status by 8 weeks  "

## 2024-09-05 ENCOUNTER — APPOINTMENT (OUTPATIENT)
Dept: PHYSICAL THERAPY | Facility: CLINIC | Age: 59
End: 2024-09-05
Payer: COMMERCIAL

## 2024-09-10 ENCOUNTER — TREATMENT (OUTPATIENT)
Dept: PHYSICAL THERAPY | Facility: CLINIC | Age: 59
End: 2024-09-10
Payer: COMMERCIAL

## 2024-09-10 DIAGNOSIS — S83.519D RUPTURE OF ANTERIOR CRUCIATE LIGAMENT OF KNEE, SUBSEQUENT ENCOUNTER: Primary | ICD-10-CM

## 2024-09-10 PROCEDURE — 97110 THERAPEUTIC EXERCISES: CPT | Mod: GP,CQ

## 2024-09-10 ASSESSMENT — PAIN SCALES - GENERAL: PAINLEVEL_OUTOF10: 0 - NO PAIN

## 2024-09-10 ASSESSMENT — PAIN - FUNCTIONAL ASSESSMENT: PAIN_FUNCTIONAL_ASSESSMENT: 0-10

## 2024-09-10 NOTE — PROGRESS NOTES
"Physical Therapy    Physical Therapy Treatment    Patient Name: Baylee Mccormick  MRN: [id number]  Today's Date: 9/10/2024    Time Entry:   Time Calculation  Start Time: 1115  Stop Time: 1159  Time Calculation (min): 44 min     PT Therapeutic Procedures Time Entry  Therapeutic Exercise Time Entry: 44      Insurance:  Visit: #5  Authorized: JYOTSNA PEDROZA AFTER EVAL   Certification: 8/15/24 to 11/13/24  Payor:  EMPLOYEE MEDICAL PLAN / Plan:  EMPLOYEE MEDICAL PLAN CONSUMER [employer] / Product Type: *No Product type* /              Assessment:   Pt had no complaints of increased pain during or after exercises. Pt was able to obtain full knee extension on right and no quad lag is noted with SLR. She would benefit from PT to continue to address impairments in order to improve strength, flexibility, gait, and body mechanics and to decrease symptoms and increase overall function.     Plan:  Continue to progress right LE strengthening as sorin. WBAT in brace   OP PT Plan  PT Plan: Skilled PT    Current Problem  1. Rupture of anterior cruciate ligament of knee, subsequent encounter                            Subjective   Pt states that she has been experiencing minimal discomfort in right medial knee.     Precautions  Precautions  STEADI Fall Risk Score (The score of 4 or more indicates an increased risk of falling): 0  LE Weight Bearing Status: Weight Bearing as Tolerated (In brace)     LE Weight Bearing Status: 8/20/24: WBAT in brace per Abimael Jacobson PA-C   Pain  Pain Assessment  Pain Assessment: 0-10  0-10 (Numeric) Pain Score: 0 - No pain    Objective      AROM right knee  Extension 0  Flexion 143                   Treatments:   11:15-11:59  Bike 5 mins lvl 10; 65 rpm, seat 13  Quad set 10\" hold x10 NT  Heel slide 2x10 NT  SLR 2x10 with quad set   Bridge  x10  Sidelying hip abduction 2 x 10  Hamstring stretch 30 sec 3   Calf stretch 30 sec x 3 NT  LAQ 5\" hold and eccentric focus x10 1#  Standing heel raises x10 with brace donned " "NT  Wall sit 3x30\" with brace donned NT  Mini Squat x15 with brace donned  TKE blue tb x15 NT  hamstring bridge walkout x10  Standing 3 way hip with GS  x 10 B with brace donned       OP EDUCATION:   Discussed tissue healing timelines and knee anatomy, expected recovery following surgery    Goals:  Improve pain to 0/10 at worst by 6 weeks  Improve AROM R knee -3 to 140 degrees painfree to allow improved stair negotiation by 6 weeks  Improve MMT R knee to 4+/5 to allow improved R knee stability by 6 weeks  Ambulate without assistive device and normalized gait pattern by 8 weeks  Demonstrate up/down 12 steps independently while maintaining appropriate weightbearing status by 8 weeks  "

## 2024-09-12 ENCOUNTER — APPOINTMENT (OUTPATIENT)
Dept: PLASTIC SURGERY | Facility: CLINIC | Age: 59
End: 2024-09-12
Payer: COMMERCIAL

## 2024-09-12 VITALS — BODY MASS INDEX: 22.47 KG/M2 | HEIGHT: 65 IN

## 2024-09-12 DIAGNOSIS — L98.8 FACIAL RHYTIDS: Primary | ICD-10-CM

## 2024-09-12 PROCEDURE — COSBX COSMETIC BOTOX: Performed by: PHYSICIAN ASSISTANT

## 2024-09-12 NOTE — PROGRESS NOTES
Department of Plastic and Reconstructive Surgery            Facial Rejuvenation     Date: 5/29/24      Subjective   Baylee Mccormick is a 59 y.o. female who presents for facial rejuvenation.     She last underwent Botox injection to the forehead, brows, and bilateral crows feet in 5/29/2024. She underwent injection of 50 units Botox to the forehead, brows, and bilateral crows feet. She tolerated previous injection and was happy with aesthetic outcome. She endorses today she has a wedding coming up in January and she is interested in trying less Botox this time.       Objective   Vital Signs: There were no vitals filed for this visit.    Gen: interactive and pleasant  Head: NCAT  Eyes: EOMI, PERRLA  Mouth: MMM  Cor: RRR  Pulm: nonlabored breathing  Neuro: AAOx3, CN II-XII intact      Focused exam of the: face  There are transverse dynmatic rhytids present with animation of there face. There are few resting transverse rhytids of the forehead present. There are periorbital rhytids present with smiling.       Botox Injection    Baylee came today for Botox injection.  I reviewed with her the risks and benefits including ptosis, infection, and bleeding. She has no contraindications. She is on no antibiotics and has no neuromuscular disorders.  Pregnancy is not an issue.     She tolerated the procedure well.  The patient  will return to see me again in three to four months, earlier if there are any problems.     Bayele understands the side effects and risks, as well as the necessity for continued treatment to maintain improvement.  Additional therapy may be necessary. Call if there are any problems. See diagram for injection sites and dosages. 35 units were used at a concentration of 10 units per 0.1 mL.   Lot: [id number]  Exp: 2026/07    10 units forehead  15 units procerus/corrugators  10units bilateral crows  35 units total        Assessment/Plan     Baylee Mccormick is a 59 y.o. female who  presents for facial rejuvenation.    She presents today for facial rejuvenation. She underwent injection of 35 units Botox to the forehead, brows, and bilateral crows feet.     Plan:   Follow up PRN    I spent 20 minutes with this patient. Greater than 50% of this time was spent in the counselling and/or coordination of care of this patient.  This note was created using voice recognition software and was not corrected for typographical or grammatical errors.    Signature: Monique Calderon PA-C

## 2024-09-17 ENCOUNTER — APPOINTMENT (OUTPATIENT)
Dept: PHYSICAL THERAPY | Facility: CLINIC | Age: 59
End: 2024-09-17
Payer: COMMERCIAL

## 2024-09-19 ENCOUNTER — TREATMENT (OUTPATIENT)
Dept: PHYSICAL THERAPY | Facility: CLINIC | Age: 59
End: 2024-09-19
Payer: COMMERCIAL

## 2024-09-19 DIAGNOSIS — S83.519D RUPTURE OF ANTERIOR CRUCIATE LIGAMENT OF KNEE, SUBSEQUENT ENCOUNTER: Primary | ICD-10-CM

## 2024-09-19 PROCEDURE — 97110 THERAPEUTIC EXERCISES: CPT | Mod: GP

## 2024-09-19 PROCEDURE — 97140 MANUAL THERAPY 1/> REGIONS: CPT | Mod: GP

## 2024-09-19 NOTE — PROGRESS NOTES
"Physical Therapy    Physical Therapy Treatment    Patient Name: Baylee Mccormick  MRN: [id number]  Today's Date: 9/19/2024    Time Entry:   Time Calculation  Start Time: 0702  Stop Time: 0746  Time Calculation (min): 44 min     PT Therapeutic Procedures Time Entry  Manual Therapy Time Entry: 9  Therapeutic Exercise Time Entry: 35      Insurance:  Visit: #6  Authorized: JYOTSNA PEDROZA AFTER EVAL   Certification: 8/15/24 to 11/13/24  Payor:  EMPLOYEE MEDICAL PLAN / Plan:  EMPLOYEE MEDICAL PLAN CONSUMER [employer] / Product Type: *No Product type* /              Assessment:   Patient is challenged with therapeutic exercises this date. Cues for correct form and frequent rest breaks. Focus on hip and knee stabilization exercises. Addition of tibial - femoral traction to relieve medial knee pain with 75% improvement in pain with stair negotiation. Continue with skilled PT to address strength and balance deficits.    Plan:  Continue to progress right LE strengthening as sorin. WBAT in brace        Current Problem  1. Rupture of anterior cruciate ligament of knee, subsequent encounter                              Subjective   Pt reports that she continues to have R medial knee pain with stair negotiation.     Precautions        LE Weight Bearing Status: 8/20/24: WBAT in brace per Abimael Jacobson PA-C   Pain       Objective      AROM right knee  Extension 0  Flexion 143                   Treatments:   11:15-11:59  Bike 5 mins lvl 10; 65 rpm, seat 13  Sidelying hip abduction 2 x 10 NT  Hamstring stretch 30 sec 3 NT  Calf stretch 30 sec x 3 NT  SL squat tap 3x8  B stance RDL 3x8  Wall sit 3x30\" with brace donned NT  Mini Squat x15 with brace donned  Lateral step down 3x8 6\" step  TKE blue tb x15 NT  hamstring bridge walkout x10  Standing 3 way hip with GS  x 10 B with brace donned NT  Manual femoral-tibial traction grade III      OP EDUCATION:   Discussed tissue healing timelines and knee anatomy, expected recovery following " surgery    Goals:  Improve pain to 0/10 at worst by 6 weeks  Improve AROM R knee -3 to 140 degrees painfree to allow improved stair negotiation by 6 weeks  Improve MMT R knee to 4+/5 to allow improved R knee stability by 6 weeks  Ambulate without assistive device and normalized gait pattern by 8 weeks  Demonstrate up/down 12 steps independently while maintaining appropriate weightbearing status by 8 weeks

## 2024-09-24 ENCOUNTER — TREATMENT (OUTPATIENT)
Dept: PHYSICAL THERAPY | Facility: CLINIC | Age: 59
End: 2024-09-24
Payer: COMMERCIAL

## 2024-09-24 DIAGNOSIS — S83.519D RUPTURE OF ANTERIOR CRUCIATE LIGAMENT OF KNEE, SUBSEQUENT ENCOUNTER: Primary | ICD-10-CM

## 2024-09-24 PROCEDURE — 97140 MANUAL THERAPY 1/> REGIONS: CPT | Mod: GP

## 2024-09-24 PROCEDURE — 97110 THERAPEUTIC EXERCISES: CPT | Mod: GP

## 2024-09-24 ASSESSMENT — PAIN SCALES - GENERAL: PAINLEVEL_OUTOF10: 0 - NO PAIN

## 2024-09-24 ASSESSMENT — PAIN - FUNCTIONAL ASSESSMENT: PAIN_FUNCTIONAL_ASSESSMENT: 0-10

## 2024-09-24 NOTE — PROGRESS NOTES
"Physical Therapy    Physical Therapy Treatment    Patient Name: Baylee Mccormick  MRN: [id number]  Today's Date: 9/24/2024    Time Entry:   Time Calculation  Start Time: 0701  Stop Time: 0747  Time Calculation (min): 46 min     PT Therapeutic Procedures Time Entry  Manual Therapy Time Entry: 8  Therapeutic Exercise Time Entry: 32      Insurance:  Visit: #7  Authorized: JYOTSNA PEDROZA AFTER EVAL   Certification: 8/15/24 to 11/13/24  Payor:  EMPLOYEE [employer]AN / Plan:  EMPLOYEE [employer] PLAN [employer] / Product Type: *No Product type* /              Assessment:   Patient challenged with single leg exercises this date focused on stability. Tactile and verbal cues to maintain appropriate form. She denies any increased pain with activities. Noted significant improvement in form with bridge extension walkout vs previous visit.     Plan:  Continue to progress right LE strengthening as sorin. WBAT in brace        Current Problem  1. Rupture of anterior cruciate ligament of knee, subsequent encounter                        PT  Visit  PT Received On: 09/24/24       Subjective   Pt reports she was able to go without her brace for a few hours over the weekend without pain. She reports feeling fatigued after last visit but no pain.     Precautions  Precautions  STEADI Fall Risk Score (The score of 4 or more indicates an increased risk of falling): 0  LE Weight Bearing Status: Weight Bearing as Tolerated (in brace)     LE Weight Bearing Status: 8/20/24: WBAT in brace per Abimael Jacobson PA-C   Pain  Pain Assessment  Pain Assessment: 0-10  0-10 (Numeric) Pain Score: 0 - No pain    Objective      AROM right knee  Extension 0  Flexion 143                   Treatments:     Bike 5 mins lvl 10; 65 rpm, seat 13  Sidelying hip abduction 2 x 10 NT  Hamstring stretch 30 sec 3 NT  Calf stretch 30 sec x 3 NT  RDL 3x8  Wall sit 3x30\" with brace donned NT  Mini Squat x15 with brace donned  Lateral step down 3x8 6\" step  Forward step up 3x8 6\" " step  TKE blue tb x15 NT  hamstring bridge walkout x10  Standing 3 way hip with GS  x 10 B with brace donned NT  Manual femoral-tibial traction grade III      OP EDUCATION:   Discussed tissue healing timelines and knee anatomy, expected recovery following surgery    Goals:  Improve pain to 0/10 at worst by 6 weeks  Improve AROM R knee -3 to 140 degrees painfree to allow improved stair negotiation by 6 weeks  Improve MMT R knee to 4+/5 to allow improved R knee stability by 6 weeks  Ambulate without assistive device and normalized gait pattern by 8 weeks  Demonstrate up/down 12 steps independently while maintaining appropriate weightbearing status by 8 weeks

## 2024-09-26 ENCOUNTER — APPOINTMENT (OUTPATIENT)
Dept: PHYSICAL THERAPY | Facility: CLINIC | Age: 59
End: 2024-09-26
Payer: COMMERCIAL

## 2024-09-27 ENCOUNTER — APPOINTMENT (OUTPATIENT)
Dept: PHYSICAL THERAPY | Facility: CLINIC | Age: 59
End: 2024-09-27
Payer: COMMERCIAL

## 2024-10-01 ENCOUNTER — APPOINTMENT (OUTPATIENT)
Dept: PHYSICAL THERAPY | Facility: CLINIC | Age: 59
End: 2024-10-01

## 2024-10-03 ENCOUNTER — APPOINTMENT (OUTPATIENT)
Dept: PHYSICAL THERAPY | Facility: CLINIC | Age: 59
End: 2024-10-03

## 2024-10-10 ENCOUNTER — APPOINTMENT (OUTPATIENT)
Dept: PHYSICAL THERAPY | Facility: CLINIC | Age: 59
End: 2024-10-10

## 2024-10-11 ENCOUNTER — APPOINTMENT (OUTPATIENT)
Dept: PHYSICAL THERAPY | Facility: CLINIC | Age: 59
End: 2024-10-11

## 2024-10-17 ENCOUNTER — APPOINTMENT (OUTPATIENT)
Dept: PHYSICAL THERAPY | Facility: CLINIC | Age: 59
End: 2024-10-17

## 2024-10-18 ENCOUNTER — APPOINTMENT (OUTPATIENT)
Dept: PHYSICAL THERAPY | Facility: CLINIC | Age: 59
End: 2024-10-18

## 2024-10-25 ENCOUNTER — APPOINTMENT (OUTPATIENT)
Dept: PRIMARY CARE | Facility: CLINIC | Age: 59
End: 2024-10-25
Payer: COMMERCIAL

## 2024-11-15 ENCOUNTER — APPOINTMENT (OUTPATIENT)
Dept: PRIMARY CARE | Facility: CLINIC | Age: 59
End: 2024-11-15
Payer: COMMERCIAL

## 2024-11-26 ENCOUNTER — TELEPHONE (OUTPATIENT)
Dept: PRIMARY CARE | Facility: CLINIC | Age: 59
End: 2024-11-26

## 2024-11-26 ENCOUNTER — APPOINTMENT (OUTPATIENT)
Dept: PRIMARY CARE | Facility: CLINIC | Age: 59
End: 2024-11-26
Payer: COMMERCIAL

## 2024-11-26 VITALS
OXYGEN SATURATION: 98 % | SYSTOLIC BLOOD PRESSURE: 140 MMHG | BODY MASS INDEX: 21.99 KG/M2 | DIASTOLIC BLOOD PRESSURE: 80 MMHG | HEIGHT: 65 IN | HEART RATE: 66 BPM | WEIGHT: 132 LBS | TEMPERATURE: 97.9 F

## 2024-11-26 DIAGNOSIS — S83.519D RUPTURE OF ANTERIOR CRUCIATE LIGAMENT OF KNEE, SUBSEQUENT ENCOUNTER: ICD-10-CM

## 2024-11-26 DIAGNOSIS — Z80.3 FAMILY HISTORY OF BREAST CANCER IN MOTHER: ICD-10-CM

## 2024-11-26 DIAGNOSIS — R03.0 ELEVATED BLOOD PRESSURE READING: ICD-10-CM

## 2024-11-26 DIAGNOSIS — D22.9 MULTIPLE NEVI: ICD-10-CM

## 2024-11-26 DIAGNOSIS — E55.9 VITAMIN D DEFICIENCY: ICD-10-CM

## 2024-11-26 DIAGNOSIS — Z00.00 ANNUAL PHYSICAL EXAM: Primary | ICD-10-CM

## 2024-11-26 DIAGNOSIS — M20.11 VALGUS DEFORMITY OF BOTH GREAT TOES: ICD-10-CM

## 2024-11-26 DIAGNOSIS — M20.12 VALGUS DEFORMITY OF BOTH GREAT TOES: ICD-10-CM

## 2024-11-26 PROCEDURE — 93000 ELECTROCARDIOGRAM COMPLETE: CPT | Performed by: INTERNAL MEDICINE

## 2024-11-26 PROCEDURE — 3008F BODY MASS INDEX DOCD: CPT | Performed by: INTERNAL MEDICINE

## 2024-11-26 PROCEDURE — 1036F TOBACCO NON-USER: CPT | Performed by: INTERNAL MEDICINE

## 2024-11-26 PROCEDURE — 99396 PREV VISIT EST AGE 40-64: CPT | Performed by: INTERNAL MEDICINE

## 2024-11-26 ASSESSMENT — ENCOUNTER SYMPTOMS
PSYCHIATRIC NEGATIVE: 1
NEUROLOGICAL NEGATIVE: 1
CONSTITUTIONAL NEGATIVE: 1
EYES NEGATIVE: 1
HEMATOLOGIC/LYMPHATIC NEGATIVE: 1
CARDIOVASCULAR NEGATIVE: 1
RESPIRATORY NEGATIVE: 1
GASTROINTESTINAL NEGATIVE: 1

## 2024-11-26 ASSESSMENT — PATIENT HEALTH QUESTIONNAIRE - PHQ9
SUM OF ALL RESPONSES TO PHQ9 QUESTIONS 1 AND 2: 0
1. LITTLE INTEREST OR PLEASURE IN DOING THINGS: NOT AT ALL
2. FEELING DOWN, DEPRESSED OR HOPELESS: NOT AT ALL

## 2024-11-26 NOTE — PROGRESS NOTES
"Subjective   Patient ID: Baylee Mccormick is a 59 y.o. female who presents for Annual Exam (Patient is here for an annual physical. ).    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  Last colonoscopy 9/4/2019 showed hyperplastic polyp next in 10 years .  Last Pap 10/24/2022  last mammogram November 11/30/2023,her mother had breast cancer onset age 50 she was referred last year for genetic testing ,patient has 2 daughter and wanted to know regarding further advice from the genetic testing, she could not go last year because she accidentally fell off her back and injured her right knee that has been managed by the orthopedic Dr Phillips, she was not on weightbearing for a month and completed physical therapy, she currently has occasional knee pain and taking Tylenol infrequently for that.  She had her flu vaccine at work ,other vaccination up-to-date.  She has bunion of both feet and asking for a new referral to orthopedic, she was not able to go last year due to her knee injury.           Review of Systems   Constitutional: Negative.    HENT: Negative.     Eyes: Negative.    Respiratory: Negative.     Cardiovascular: Negative.    Gastrointestinal: Negative.    Genitourinary: Negative.    Musculoskeletal:         As above on HPI.   Neurological: Negative.    Hematological: Negative.    Psychiatric/Behavioral: Negative.         Objective   /80   Pulse 66   Temp 36.6 °C (97.9 °F) (Temporal)   Ht 1.651 m (5' 5\")   Wt 59.9 kg (132 lb)   SpO2 98%   BMI 21.97 kg/m²     Physical Exam  Vitals reviewed.   Constitutional:       General: She is not in acute distress.     Appearance: Normal appearance. She is normal weight.   HENT:      Head: Normocephalic and atraumatic.      Right Ear: Tympanic membrane, ear canal and external ear normal.      Left Ear: Tympanic membrane, ear canal and " external ear normal.      Mouth/Throat:      Mouth: Mucous membranes are moist.      Pharynx: No oropharyngeal exudate or posterior oropharyngeal erythema.   Eyes:      General: No scleral icterus.     Extraocular Movements: Extraocular movements intact.      Conjunctiva/sclera: Conjunctivae normal.      Pupils: Pupils are equal, round, and reactive to light.   Neck:      Vascular: No carotid bruit.   Cardiovascular:      Rate and Rhythm: Normal rate and regular rhythm.      Pulses: Normal pulses.      Heart sounds: Normal heart sounds. No murmur heard.  Pulmonary:      Effort: Pulmonary effort is normal. No respiratory distress.      Breath sounds: Normal breath sounds.   Chest:   Breasts:     Right: Normal. No mass, skin change or tenderness.      Left: Normal. No mass, skin change or tenderness.   Abdominal:      General: Abdomen is flat. Bowel sounds are normal.      Palpations: Abdomen is soft. There is no mass.      Tenderness: There is no abdominal tenderness. There is no right CVA tenderness or left CVA tenderness.   Musculoskeletal:         General: Normal range of motion.      Cervical back: Normal range of motion and neck supple.      Right lower leg: No edema.      Left lower leg: No edema.      Comments: There is hallux valgus both feet.   Lymphadenopathy:      Cervical: No cervical adenopathy.      Upper Body:      Right upper body: No supraclavicular or axillary adenopathy.      Left upper body: No supraclavicular or axillary adenopathy.   Skin:     Comments: Multiple nevi and sun spots.   Neurological:      General: No focal deficit present.      Mental Status: She is alert and oriented to person, place, and time.      Cranial Nerves: No cranial nerve deficit.      Motor: No weakness.      Coordination: Coordination normal.      Gait: Gait normal.      Deep Tendon Reflexes: Reflexes normal.   Psychiatric:         Mood and Affect: Mood normal.         Assessment/Plan   Problem List Items Addressed  This Visit             ICD-10-CM    Vitamin D deficiency E55.9     Continue OTC vitamin D.   check vitamin D level.           Relevant Orders    Vitamin D 25-Hydroxy,Total (for eval of Vitamin D levels)    Annual physical exam - Primary Z00.00     Complete physical examination completed today.  Advised to keep a heart healthy, low-fat diet as recommended diet is the Mediterranean diet.  Advised to exercise regularly for 30 minutes daily 5 days a week and maintain 150 minutes of exercise per week.  Discussed age-appropriate cancer screening,Immunization and recommendation were given.  Advised on regular eye and dental visit.  Advised on staying well-hydrated.         Relevant Orders    BI mammo bilateral screening tomosynthesis    Referral to Genetics    CBC    Comprehensive Metabolic Panel    Lipid Panel    TSH with reflex to Free T4 if abnormal    Vitamin D 25-Hydroxy,Total (for eval of Vitamin D levels)    Hemoglobin A1C    ECG 12 lead (Clinic Performed) (Completed)    Rupture of anterior cruciate ligament of knee, subsequent encounter S83.519D     Improved was treated by orthopedic.         Elevated blood pressure reading R03.0     Repeat blood pressure 140/80.  Follow low-sodium diet.  Monitor home BP for next 1 week and email BP reading through MyChart         Valgus deformity of both great toes M20.11, M20.12    Relevant Orders    Referral to Orthopaedic Surgery    Family history of breast cancer in mother Z80.3    Multiple nevi D22.9    Relevant Orders    Referral to Dermatology

## 2024-11-26 NOTE — TELEPHONE ENCOUNTER
Before patient left the office she wanted me to recheck her bp. Her bp was still in the 140/80s. Patient says that it is usually low at home. Patient is wondering what the next steps are. OKAY to message patient on MYCHART.     Please advise.

## 2024-11-26 NOTE — ASSESSMENT & PLAN NOTE
Repeat blood pressure 140/80.  Follow low-sodium diet.  Monitor home BP for next 1 week and email BP reading through [place]

## 2024-12-19 ENCOUNTER — APPOINTMENT (OUTPATIENT)
Dept: PLASTIC SURGERY | Facility: CLINIC | Age: 59
End: 2024-12-19
Payer: COMMERCIAL

## 2024-12-19 ENCOUNTER — HOSPITAL ENCOUNTER (OUTPATIENT)
Dept: RADIOLOGY | Facility: CLINIC | Age: 59
Discharge: HOME | End: 2024-12-19
Payer: COMMERCIAL

## 2024-12-19 VITALS — HEIGHT: 65 IN | WEIGHT: 132 LBS | BODY MASS INDEX: 21.99 KG/M2

## 2024-12-19 DIAGNOSIS — Z00.00 ANNUAL PHYSICAL EXAM: ICD-10-CM

## 2024-12-19 DIAGNOSIS — L98.8 FACIAL RHYTIDS: Primary | ICD-10-CM

## 2024-12-19 PROCEDURE — 77063 BREAST TOMOSYNTHESIS BI: CPT | Performed by: RADIOLOGY

## 2024-12-19 PROCEDURE — 77067 SCR MAMMO BI INCL CAD: CPT

## 2024-12-19 PROCEDURE — 77067 SCR MAMMO BI INCL CAD: CPT | Performed by: RADIOLOGY

## 2024-12-19 PROCEDURE — COSBX COSMETIC BOTOX: Performed by: PHYSICIAN ASSISTANT

## 2024-12-19 NOTE — PROGRESS NOTES
Department of Plastic and Reconstructive Surgery            Facial Rejuvenation     Date: 5/29/24      Subjective   Baylee Mccormick is a 59 y.o. female who presents for facial rejuvenation.     She last underwent Botox injection to the forehead, brows, and bilateral crows feet in 9/12/2024. She underwent injection of 50 units Botox to the forehead, brows, and bilateral crows feet. She tolerated previous injection and was happy with aesthetic outcome.     Objective   Vital Signs: There were no vitals filed for this visit.    Gen: interactive and pleasant  Head: NCAT  Eyes: EOMI, PERRLA  Mouth: MMM  Cor: RRR  Pulm: nonlabored breathing  Neuro: AAOx3, CN II-XII intact      Focused exam of the: face  There are transverse dynmatic rhytids present with animation of there face. There are few resting transverse rhytids of the forehead present. There are periorbital rhytids present with smiling.       Botox Injection    Baylee came today for Botox injection.  I reviewed with her the risks and benefits including ptosis, infection, and bleeding. She has no contraindications. She is on no antibiotics and has no neuromuscular disorders.  Pregnancy is not an issue.     She tolerated the procedure well.  The patient  will return to see me again in three to four months, earlier if there are any problems.     Baylee understands the side effects and risks, as well as the necessity for continued treatment to maintain improvement.  Additional therapy may be necessary. Call if there are any problems. See diagram for injection sites and dosages. 35 units were used at a concentration of 10 units per 0.1 mL.   Lot: [id number]  Exp: 2026/07    15 units forehead  25 units procerus/corrugators  10units bilateral crows  50 units total        Assessment/Plan     Baylee Mccormick is a 59 y.o. female who presents for facial rejuvenation.    She presents today for facial rejuvenation. She underwent injection of 50 units  Botox to the forehead, brows, and bilateral crows feet.     Plan:   Follow up PRN    I spent 20 minutes with this patient. Greater than 50% of this time was spent in the counselling and/or coordination of care of this patient.  This note was created using voice recognition software and was not corrected for typographical or grammatical errors.    Signature: Monique Calderon PA-C

## 2025-05-29 ENCOUNTER — TELEPHONE (OUTPATIENT)
Facility: CLINIC | Age: 60
End: 2025-05-29
Payer: COMMERCIAL

## 2025-06-04 ENCOUNTER — APPOINTMENT (OUTPATIENT)
Facility: CLINIC | Age: 60
End: 2025-06-04
Payer: COMMERCIAL

## 2025-06-04 VITALS — HEIGHT: 65 IN | BODY MASS INDEX: 21.99 KG/M2 | WEIGHT: 132 LBS

## 2025-06-04 DIAGNOSIS — L98.8 FACIAL RHYTIDS: Primary | ICD-10-CM

## 2025-06-04 PROCEDURE — 3008F BODY MASS INDEX DOCD: CPT | Performed by: PHYSICIAN ASSISTANT

## 2025-06-04 PROCEDURE — COSBX COSMETIC BOTOX: Performed by: PHYSICIAN ASSISTANT

## 2025-06-04 NOTE — PROGRESS NOTES
Department of Plastic and Reconstructive Surgery            Facial Rejuvenation     Date: 06/04/25    Subjective   Baylee Mccormick is a 60 y.o. female who presents for facial rejuvenation.     She last underwent Botox injection to the forehead, brows, and bilateral crows feet in 12/19/2024. She underwent injection of 50 units Botox to the forehead, brows, and bilateral crows feet. She tolerated previous injection and was happy with aesthetic outcome.     Objective   Vital Signs: There were no vitals filed for this visit.    Gen: interactive and pleasant  Head: NCAT  Eyes: EOMI, PERRLA  Mouth: MMM  Cor: RRR  Pulm: nonlabored breathing  Neuro: AAOx3, CN II-XII intact      Focused exam of the: face  There are transverse dynmatic rhytids present with animation of there face. There are few resting transverse rhytids of the forehead present. There are periorbital rhytids present with smiling.       Botox Injection    Baylee came today for Botox injection.  I reviewed with her the risks and benefits including ptosis, infection, and bleeding. She has no contraindications. She is on no antibiotics and has no neuromuscular disorders.  Pregnancy is not an issue.     She tolerated the procedure well.  The patient  will return to see me again in three to four months, earlier if there are any problems.     Baylee understands the side effects and risks, as well as the necessity for continued treatment to maintain improvement.  Additional therapy may be necessary. Call if there are any problems. See diagram for injection sites and dosages. 35 units were used at a concentration of 10 units per 0.1 mL.   Lot: [id number]  Exp: 2026/10    15 units forehead  25 units procerus/corrugators  10units bilateral crows  50 units total        Assessment/Plan     Baylee Mccormick is a 60 y.o. female who presents for facial rejuvenation.    She presents today for facial rejuvenation. She underwent injection of 50 units  Botox to the forehead, brows, and bilateral crows feet.     Plan:   Follow up PRN    I spent 20 minutes with this patient. Greater than 50% of this time was spent in the counselling and/or coordination of care of this patient.  This note was created using voice recognition software and was not corrected for typographical or grammatical errors.    Signature: Monique Calderon PA-C

## 2025-07-29 ENCOUNTER — APPOINTMENT (OUTPATIENT)
Dept: INTEGRATIVE MEDICINE | Facility: CLINIC | Age: 60
End: 2025-07-29
Payer: COMMERCIAL

## 2025-08-28 ENCOUNTER — APPOINTMENT (OUTPATIENT)
Dept: INTEGRATIVE MEDICINE | Facility: CLINIC | Age: 60
End: 2025-08-28

## 2025-09-10 ENCOUNTER — APPOINTMENT (OUTPATIENT)
Facility: CLINIC | Age: 60
End: 2025-09-10
Payer: COMMERCIAL

## 2025-09-25 ENCOUNTER — APPOINTMENT (OUTPATIENT)
Dept: INTEGRATIVE MEDICINE | Facility: CLINIC | Age: 60
End: 2025-09-25
Payer: COMMERCIAL

## 2025-11-11 ENCOUNTER — APPOINTMENT (OUTPATIENT)
Dept: PRIMARY CARE | Facility: CLINIC | Age: 60
End: 2025-11-11
Payer: COMMERCIAL